# Patient Record
Sex: MALE | Race: WHITE | NOT HISPANIC OR LATINO | Employment: OTHER | URBAN - METROPOLITAN AREA
[De-identification: names, ages, dates, MRNs, and addresses within clinical notes are randomized per-mention and may not be internally consistent; named-entity substitution may affect disease eponyms.]

---

## 2024-06-25 ENCOUNTER — TRANSFERRED RECORDS (OUTPATIENT)
Dept: HEALTH INFORMATION MANAGEMENT | Facility: CLINIC | Age: 79
End: 2024-06-25
Payer: COMMERCIAL

## 2024-07-11 ENCOUNTER — TELEPHONE (OUTPATIENT)
Dept: DERMATOLOGY | Facility: CLINIC | Age: 79
End: 2024-07-11
Payer: COMMERCIAL

## 2024-07-11 NOTE — TELEPHONE ENCOUNTER
Called patient to schedule surgery with Dr. Rincon    Date of Surgery: 08/29    Surgery type: Mohs    Consult scheduled: Yes    Has patient had mohs with us before? No    Additional comments: friend of Dr. Darius Jaramillo on 7/11/2024 at 2:02 PM

## 2024-08-20 ENCOUNTER — VIRTUAL VISIT (OUTPATIENT)
Dept: DERMATOLOGY | Facility: CLINIC | Age: 79
End: 2024-08-20
Payer: COMMERCIAL

## 2024-08-20 DIAGNOSIS — C44.311 BASAL CELL CARCINOMA (BCC) OF DORSUM OF NOSE: Primary | ICD-10-CM

## 2024-08-20 PROCEDURE — 99204 OFFICE O/P NEW MOD 45 MIN: CPT | Performed by: DERMATOLOGY

## 2024-08-20 RX ORDER — LISINOPRIL 10 MG/1
TABLET ORAL
COMMUNITY

## 2024-08-20 RX ORDER — HYDROCHLOROTHIAZIDE 25 MG/1
1 TABLET ORAL DAILY
COMMUNITY
Start: 2024-01-05

## 2024-08-20 RX ORDER — FLUOXETINE 10 MG/1
1 CAPSULE ORAL DAILY
COMMUNITY
Start: 2024-01-05

## 2024-08-20 NOTE — LETTER
8/20/2024       RE: Russell Lara  [address] [address]     Dear Colleague,    Thank you for referring your patient, Russell Lara, to the [facility] [place] at [facility]. Please see a copy of my visit note below.    [facility] Dermatology Note  Encounter Date: Aug 20, 2024  Store-and-Forward and Telephone. Location of teledermatologist: [facility] [place].  Start time: 3:30. End time: 3:35.    Dermatologic Surgery Telemedicine Consult Note    Dermatology Problem List:  Hx of NMSC  - BCC, R bridge of nose, Bx proven on 6/25/24, pending Mohs     CC: No chief complaint on file.      Subjective: Russell Lara is a 79 year old male who presents today for Mohs micrographic surgery consultation for a recent diagnosis of skin cancer.  - Skin cancer(s): BCC  - Location(s): R bridge of nose  - first skin cancer  - no other concerns today         Objective:   Skin: Focused examination of the nose within the teledermatology photograph(s) on  was performed.   - p[early papule on nasal dorsum    Path report:   Pathology report: 6/25/24  - BCC (nodular    Assessment and Plan:     1. Plan for Mohs micrographic surgery for skin cancer(s) above:  *Review lab result(s): Dermpath report   - We discussed the nature of the diagnosis/condition above. We discussed the treatment options, including the risks benefits and expectations of these options. We recommend micrographic surgery as the most effective and most tissue sparing option for treatment, and the patient agrees to proceed with this.  The patient is aware of the risks, benefits and expectations of this procedure. The patient will be scheduled for this procedure, if not already done so.  - We anticipate the following closure type: Sliding or lifting flap    The patient was discussed with and evaluated by attending  physician, Kyle Rincon MD.    Scribe Disclosure:   I, JOLIE HOOD, am serving as a scribe; to document services personally performed by Kyle Rincon MD -based on data collection and the provider's statements to me.     Provider Disclosure:  I agree with above History, Review of Systems, Physical exam and Plan.  I have reviewed the content of the documentation and have edited it as needed. I have personally performed the services documented here and the documentation accurately represents those services and the decisions I have made.      Electronically signed by:      Attending Attestation  I attest that the Scribe recorded the interview and exam that I personally performed.  I have reviewed the note and edited it as necessary.    Kyle Rincon M.D.  Professor  Director of Dermatologic Surgery  Department of Dermatology  [facility]      Again, thank you for allowing me to participate in the care of your patient.      Sincerely,    Kyle Rincon MD

## 2024-08-20 NOTE — NURSING NOTE
Chief Complaint   Patient presents with    Derm Problem     R nose BCC     Elena SMITH, RN-BSN  Dermatology Surgery  [phone]

## 2024-08-20 NOTE — PROGRESS NOTES
[facility] Dermatology Note  Encounter Date: Aug 20, 2024  Store-and-Forward and Telephone. Location of teledermatologist: [facility] DERMATOLOG[facility] SURGERY [facility]INIC [place].  Start time: 3:30. End time: 3:35.    Dermatologic Surgery Telemedicine Consult Note    Dermatology Problem List:  Hx of NMSC  - BCC, R bridge of nose, Bx proven on 6/25/24, pending Mohs     CC: No chief complaint on file.      Subjective: Russell Lara is a 79 year old male who presents today for Mohs micrographic surgery consultation for a recent diagnosis of skin cancer.  - Skin cancer(s): BCC  - Location(s): R bridge of nose  - first skin cancer  - no other concerns today         Objective:   Skin: Focused examination of the nose within the teledermatology photograph(s) on  was performed.   - p[early papule on nasal dorsum    Path report:   Pathology report: 6/25/24  - BCC (nodular    Assessment and Plan:     1. Plan for Mohs micrographic surgery for skin cancer(s) above:  *Review lab result(s): Dermpath report   - We discussed the nature of the diagnosis/condition above. We discussed the treatment options, including the risks benefits and expectations of these options. We recommend micrographic surgery as the most effective and most tissue sparing option for treatment, and the patient agrees to proceed with this.  The patient is aware of the risks, benefits and expectations of this procedure. The patient will be scheduled for this procedure, if not already done so.  - We anticipate the following closure type: Sliding or lifting flap    The patient was discussed with and evaluated by attending physician, Kyle Rincon MD.    Scribe Disclosure:   I, JOLIE HOOD, am serving as a scribe; to document services personally performed by Kyle Rincon MD -based on data collection and the provider's statements to me.     Provider Disclosure:  I agree with above History, Review of Systems, Physical exam and Plan.  I have  reviewed the content of the documentation and have edited it as needed. I have personally performed the services documented here and the documentation accurately represents those services and the decisions I have made.      Electronically signed by:      Attending Attestation  I attest that the Scribe recorded the interview and exam that I personally performed.  I have reviewed the note and edited it as necessary.    Kyle Rincon M.D.  Professor  Director of Dermatologic Surgery  Department of Dermatology  [facility]

## 2024-08-29 ENCOUNTER — TELEPHONE (OUTPATIENT)
Dept: DERMATOLOGY | Facility: CLINIC | Age: 79
End: 2024-08-29

## 2024-08-29 ENCOUNTER — OFFICE VISIT (OUTPATIENT)
Dept: DERMATOLOGY | Facility: CLINIC | Age: 79
End: 2024-08-29
Payer: COMMERCIAL

## 2024-08-29 VITALS — HEART RATE: 59 BPM | DIASTOLIC BLOOD PRESSURE: 88 MMHG | SYSTOLIC BLOOD PRESSURE: 139 MMHG

## 2024-08-29 DIAGNOSIS — C44.311 BASAL CELL CARCINOMA (BCC) OF DORSUM OF NOSE: Primary | ICD-10-CM

## 2024-08-29 PROCEDURE — 17311 MOHS 1 STAGE H/N/HF/G: CPT | Performed by: DERMATOLOGY

## 2024-08-29 PROCEDURE — 17312 MOHS ADDL STAGE: CPT | Performed by: DERMATOLOGY

## 2024-08-29 PROCEDURE — 14060 TIS TRNFR E/N/E/L 10 SQ CM/<: CPT | Performed by: DERMATOLOGY

## 2024-08-29 ASSESSMENT — PAIN SCALES - GENERAL: PAINLEVEL: NO PAIN (0)

## 2024-08-29 NOTE — TELEPHONE ENCOUNTER
Follow up call completed following Mohs procedure with Dr. Rincon.       Are you having pain? mild  Are you taking pain medication? tylenol  Are you applying ice?  no  Have you had any noticeable bleeding through the bandage? no  Do you have any other concerns?  no      Please call [phone] if you have any questions or concerns.    Cate BRISENO RN  Dermatology Surgery  [phone]

## 2024-08-29 NOTE — NURSING NOTE
Chief Complaint   Patient presents with    Procedure     Mohs and reconstruction to Left  nasal bridge       Ama DIAZ CMA

## 2024-08-29 NOTE — PATIENT INSTRUCTIONS
Wound care    I will experience scar, bleeding, swelling, pain, crusting and redness. I may experience incomplete removal or recurrence. Risks are bleeding, bruising, swelling, infection, nerve damage, & a large wound. A second procedure may be recommended to obtain the best cosmetic or functional result.       A three month office visit with your Surgeon is recommended for scar evaluation. Please reach out sooner if you have concerns about you surgical site/wound.    Caring for your skin after surgery    After your surgery, a pressure bandage will be placed over the area that has stitches. This is important to prevent bleeding. Please follow these instructions over the next 1 to 2 weeks. Following this regimen will help to prevent complications as your wound heals.     For the first 48 hours after your surgery:    Leave the pressure dressing on and keep it dry. If it should come loose, you may re-tape it, but do not take it off.  Relax and take it easy. Do not do any vigorous exercise or heavy lifting. This could cause the wound to bleed.  Post-Operative pain is usually mild. If you are able to take tylenol, You may take plain or extra-strength Tylenol (acetaminophen) As directed on the bottle (do not take more than 4,000mg in one day). If you are able to take ibuprofen, you can alternate the tylenol and ibuprofen.   Avoid alcohol as this may increase your tendency to bleed.   You may put an ice pack around the bandaged area for 20 minutes at a time as needed. This may help reduce swelling, bruising, and pain. Make sure the ice pack is waterproof so that the pressure bandage doesn t get wet.  If the wound is on the face try to sleep with your head elevated. Either in a recliner or propped up in bed, this will decrease swelling around the eyes.   You may see a small amount of drainage or blood on your pressure bandage. This is normal. However:  If drainage or bleeding continues or saturates the bandage, you will  need to apply firm pressure over the bandage with a piece of gauze for 15 minutes.  If bleeding continues after applying pressure for 15 minutes, apply an ice pack to the bandaged area for 15 minutes.  If bleeding still continues, call our office or go to the nearest emergency room.    Remove pressure dressing 48 hours after surgery:    Carefully remove the pressure bandage. If it seems sticky or too difficult to get off, you may need to soak it off in the shower.  After the pressure dressing is removed, you may shower and get the wound wet. However, Do Not let the forceful stream of the shower hit the wound directly.  Follow these wound care and dressing change instructions:    You have skin glue over the stitches. This will dry and flake off with time (about 2 weeks). If the stitches are still hanging around after 2 weeks, let us know, we can clip them out for you if they do not fall out with washing.   You may allow water to run over the site. Do not soak.  Do Not rub or scrub the site.  Pat dry after the shower or bath.  Avoid topical medications, lotions, creams, ointment,or oils.  Do not use tanning lamps or expose the site to sun.   Check wound appearance daily, some swelling and redness is normal after a procedure but should go away as your would is healing. If the swelling and redness or pain increases or if any other signs of infection occur listed below please send in a photo via my chart and or call us to let us know.  The clear glue film should start peeling and flaking off approximately around 2 weeks. By this time your wound should be sufficiently healed. If it still looks to be healing when the glue comes off you can clean the wound with soapy warm water daily in the shower. No need to bandage further, but you can put a bandage on the area daily for the two weeks if you would like.   If skin glue and sutures are still intact at 2 weeks after your procedure, you can start applying Vaseline daily to  "help soften up the skin glue. It will come off easier this way.        If you are able to take acetaminophen (\"Tylenol,\" etc.) and ibuprofen (\"Advil\" or \"Motrin,\" etc.), then you may STAGGER these medications by taking 400 mg of ibuprofen (usually two tabs) every 8 hours and 1,000 mg of acetaminophen (e.g., two tabs of extra-strength Tylenol) every 8 hours.    This means, for example, that you could take the followin,000 mg of Tylenol, followed 4 hours later by 400 mg Ibuprofen, followed 4 hours later with 1,000 mg of Tylenol, followed 4 hours later by 400 mg Ibuprofen, followed 4 hours later with 1,000 mg of Tylenol, and so forth.     Essentially, you can take either 1,000 mg of Tylenol or 400 mg of ibuprofen in alternating fashion EVERY FOUR HOURS.    Do NOT exceed more than 4,000 mg of Tylenol or 3,200 mg of ibuprofen per 24 hours. If you are not able to take Tylenol or ibuprofen as above due to other health issues (or a physician has told you directly that you are not allowed to take one of them, say due to pre-existing severe liver or kidney issues), then disregard the above directions.    Scientific evidence supports that this combination/schedule of pain medications is just as effective, if not more effective, than taking a narcotic pain medicine.       Follow up will be a 3 month scar evaluation either in person or via a telephone visit with you sending in a photo via BULX. Unless you have been told to follow up sooner or if you have concerns and would like to be see sooner. Please call or send us in a BULX message if possible and attach a photo.        What to expect:    The first couple of days your wound may be tender and may bleed slightly when doing wound care.  There may be swelling and bruising around the wound, especially if it is near the eyes. For your comfort, you may apply ice or cold compresses to the bruises after your have removed the pressure bandage.  The area around your wound may " be numb for several weeks or even months.  You may experience periodic sharp pain or mild itching around the wound as it heals.   The suture line will look dark for a while but will lighten over time.       When to call us:    You have bleeding that will not stop after applying pressure and ice.  You have pain that is not controlled with Tylenol (acetaminophen.)  You have signs or symptoms of an infection such as:  Fever over 100 degrees Fahrenheit  Redness, warmth or foul-smelling drainage from the wound  If you have any questions, or are not sure how to take care of the wound.    Phone numbers:    During business hours (M-F 8:00-4:30 p.m.)  [facility]-  [phone] Option 1 appt. Desk and ask for the Dermatology Surgery Team  [phone] [facility] .     ---------------------------------------------------------  Evenings/Weekends/Holidays  Hospital - [phone]   TTY for hearing pcamdswy-[phone]  *Ask  to page dermatologist on-call  Emergency [facility]-[phone]  TTY for hearing impaired- [phone]

## 2024-08-29 NOTE — LETTER
8/29/2024       RE: Russell Lara  [address] [address]     Dear Colleague,    Thank you for referring your patient, Russell Lara, to the [facility] DERMATOLOGIC SURGERY CLINIC [place] at [facility]. Please see a copy of my visit note below.    [facility] Dermatologic Surgery Clinic [place] Procedure Note    Date of Service:  Aug 29, 2024  Surgery: Mohs micrographic surgery    Case 1  Repair Type: Advancement flap  Repair Size: 2.5 x 1.0 cm  Suture Material: 4-0 Monocryl, 5-0 Fast absorbing gut  Tumor Type: BCC  Location: Left nose bridge  Derm-Path Accession #: [id number]  PreOp Size: 1.0 x 1.0 cm  PostOp Size: 1.5 x 1.2 cm  Mohs Accession #:   Level of Defect: Perichondrium      Procedure:  We discussed the principles of treatment and most likely complications including scarring, bleeding, infection, swelling, pain, crusting, nerve damage, large wound,  incomplete excision, wound dehiscence,  nerve damage, recurrence, and a second procedure may be recommended to obtain the best cosmetic or functional result.    Informed consent was obtained and the patient underwent the procedure as follows:  The patient was placed supine on the operating table.  The cancer was identified, outlined with a marker, and verified by the patient.  The entire surgical field was prepped with Hibiclens.  The surgical site was anesthetized using 1% lido with epi.      The area of clinically apparent tumor was not debulked. The layer of tissue was then surgically excised using a #15 blade and was then transferred onto a specimen sheet maintaining the orientation of the specimen. Hemostasis was obtained using monopolar electrocoagulation. The wound site was then covered with a dressing while the tissue samples were processed for examination.    The excised tissue was transported to the Mohs histology laboratory maintaining the tissue orientation.   The tissue specimen was relaxed so that the entire surgical margin was in a a single horizontal plane for sectioning and inked for precise mapping.  A precise reference map was drawn to reflect the sectioning of the specimen, colored inking of the margins, and orientation on the patient.  The tissue was processed using horizontal sectioning of the base and continuous peripheral margins.  The histopathologic sections were reviewed in conjunction with the reference map.    Total blocks: 1    Total slides:  2    Residual tumor was identified and indicated in red on the reference map, identifying the location where further tissue excision was necessary. Therefore, an additional stage of Mohs Micrographic surgery was deemed necessary.     Stage II   The patient was returned to the operating room, and the area prepped in the usual manner. The residual tumor was excised using the reference map as a guide. The specimen was transfered to a labeled specimen sheet maintaining the orientation of the specimen. Hemostasis was obtained and the wound site was covered with a dressing while the tissue was processed for examination.     The excised tissue was transported to the Mohs histology laboratory maintaining orientation. The specimen margins were inked for precise mapping and a reference map was prepared for the is additional stage to maintain precise orientation as described above. The tissue was processed using horizontal sectioning of the base and continuous peripheral margins. The histopathologic sections were reviewed in conjunction with the reference map.     Total blocks: 1    Total slides:  2    There were no cancer cells visualized on examination, therefore Mohs surgery was complete.     Reconstruction:  Advancement Flap    PROCEDURE:  The wound was debeveled and undermined broadly in all directions to the level of fat. Hemostasis was obtained using  .  The advancement flap was incised to the level of fat laterally and  to periosteum medially. The flap was further undermined in all directions.    Hemostasis was again obtained. The flap was then advanced into the defect and secured using buried dermal sutures.  Redundant areas of tissue were excised using the triangulation technique.  The flap wound edges of both the primary and secondary defects were then approximated with buried 4-0 Monocryl dermal sutures and the epidermis was then carefully approximated using  percutaneous simple running 5-0 fast absorbing gut sutures.  The wound was cleansed with saline, and ointment was applied along the wound surface.  A sterile pressure dressing of non-adherent gauze was applied and wound care instructions were given verbally and in writing. The patient left the operating suite in stable condition.  Anticipate Derma-Abrasion to be used as a second stage of this reconstruction.     Repair Size: 2.5 x 1.0 cm  Sutures Used:  4-0 Monocryl, 5-0 Fast absorbing gut    Dr.Ian Rincon was present for the entire procedure  and always immediately available.    Staff Involved:   Scribe/Fellow/Staff     Scribe Disclosure:   I, MOHSEN SUAREZ, am serving as a scribe; to document services personally performed by Kyle Rincon MD -based on data collection and the provider's statements to me.    Attending attestation:  I personally performed the entire procedure.  I have reviewed the note and edited it as necessary, and agree with its contents.    Kyle Rincon M.D.  Professor  Director of Dermatologic Surgery  Department of Dermatology  [facility]    Dermatology Surgery Clinic  [facility] and Surgery [place] [id number]      Again, thank you for allowing me to participate in the care of your patient.      Sincerely,    Kyle Rincon MD

## 2024-08-29 NOTE — PROGRESS NOTES
[facility] Procedure Note    Date of Service:  Aug 29, 2024  Surgery: Mohs micrographic surgery    Case 1  Repair Type: Advancement flap  Repair Size: 2.5 x 1.0 cm  Suture Material: 4-0 Monocryl, 5-0 Fast absorbing gut  Tumor Type: BCC  Location: Left nose bridge  Derm-Path Accession #: [id number]  PreOp Size: 1.0 x 1.0 cm  PostOp Size: 1.5 x 1.2 cm  Mohs Accession #:   Level of Defect: Perichondrium      Procedure:  We discussed the principles of treatment and most likely complications including scarring, bleeding, infection, swelling, pain, crusting, nerve damage, large wound,  incomplete excision, wound dehiscence,  nerve damage, recurrence, and a second procedure may be recommended to obtain the best cosmetic or functional result.    Informed consent was obtained and the patient underwent the procedure as follows:  The patient was placed supine on the operating table.  The cancer was identified, outlined with a marker, and verified by the patient.  The entire surgical field was prepped with Hibiclens.  The surgical site was anesthetized using 1% lido with epi.      The area of clinically apparent tumor was not debulked. The layer of tissue was then surgically excised using a #15 blade and was then transferred onto a specimen sheet maintaining the orientation of the specimen. Hemostasis was obtained using monopolar electrocoagulation. The wound site was then covered with a dressing while the tissue samples were processed for examination.    The excised tissue was transported to the Mohs histology laboratory maintaining the tissue orientation.  The tissue specimen was relaxed so that the entire surgical margin was in a a single horizontal plane for sectioning and inked for precise mapping.  A precise reference map was drawn to reflect the sectioning of the specimen, colored inking of the margins, and orientation on the patient.  The tissue was processed using  horizontal sectioning of the base and continuous peripheral margins.  The histopathologic sections were reviewed in conjunction with the reference map.    Total blocks: 1    Total slides:  2    Residual tumor was identified and indicated in red on the reference map, identifying the location where further tissue excision was necessary. Therefore, an additional stage of Mohs Micrographic surgery was deemed necessary.     Stage II   The patient was returned to the operating room, and the area prepped in the usual manner. The residual tumor was excised using the reference map as a guide. The specimen was transfered to a labeled specimen sheet maintaining the orientation of the specimen. Hemostasis was obtained and the wound site was covered with a dressing while the tissue was processed for examination.     The excised tissue was transported to the Mohs histology laboratory maintaining orientation. The specimen margins were inked for precise mapping and a reference map was prepared for the is additional stage to maintain precise orientation as described above. The tissue was processed using horizontal sectioning of the base and continuous peripheral margins. The histopathologic sections were reviewed in conjunction with the reference map.     Total blocks: 1    Total slides:  2    There were no cancer cells visualized on examination, therefore Mohs surgery was complete.     Reconstruction:  Advancement Flap    PROCEDURE:  The wound was debeveled and undermined broadly in all directions to the level of fat. Hemostasis was obtained using  .  The advancement flap was incised to the level of fat laterally and to periosteum medially. The flap was further undermined in all directions.    Hemostasis was again obtained. The flap was then advanced into the defect and secured using buried dermal sutures.  Redundant areas of tissue were excised using the triangulation technique.  The flap wound edges of both the primary and  secondary defects were then approximated with buried 4-0 Monocryl dermal sutures and the epidermis was then carefully approximated using  percutaneous simple running 5-0 fast absorbing gut sutures.  The wound was cleansed with saline, and ointment was applied along the wound surface.  A sterile pressure dressing of non-adherent gauze was applied and wound care instructions were given verbally and in writing. The patient left the operating suite in stable condition.  Anticipate Derma-Abrasion to be used as a second stage of this reconstruction.     Repair Size: 2.5 x 1.0 cm  Sutures Used:  4-0 Monocryl, 5-0 Fast absorbing gut    Dr.Ian Rincon was present for the entire procedure  and always immediately available.    Staff Involved:   Scribe/Fellow/Staff     Scribe Disclosure:   I, MOHSEN SUAREZ, am serving as a scribe; to document services personally performed by Kyle Rincon MD -based on data collection and the provider's statements to me.    Attending attestation:  I personally performed the entire procedure.  I have reviewed the note and edited it as necessary, and agree with its contents.    Kyle Rincon M.D.  Professor  Director of Dermatologic Surgery  Department of Dermatology  [facility]    Dermatology Surgery Clinic  [facility]  [address] [place], [place] [id number]

## 2024-08-29 NOTE — NURSING NOTE
Chief Complaint   Patient presents with    Procedure     Mohs and reconstruction to right nasal bridge       Ama DIAZ CMA

## 2025-02-08 ENCOUNTER — HEALTH MAINTENANCE LETTER (OUTPATIENT)
Age: 80
End: 2025-02-08